# Patient Record
Sex: MALE | Race: WHITE | NOT HISPANIC OR LATINO | Employment: OTHER | URBAN - METROPOLITAN AREA
[De-identification: names, ages, dates, MRNs, and addresses within clinical notes are randomized per-mention and may not be internally consistent; named-entity substitution may affect disease eponyms.]

---

## 2023-10-27 ENCOUNTER — TRANSFERRED RECORDS (OUTPATIENT)
Dept: HEALTH INFORMATION MANAGEMENT | Facility: CLINIC | Age: 63
End: 2023-10-27

## 2023-10-27 LAB — EJECTION FRACTION: 50 %

## 2023-11-15 LAB — EJECTION FRACTION: 66 %

## 2024-02-26 ENCOUNTER — ANESTHESIA EVENT (OUTPATIENT)
Dept: SURGERY | Facility: HOSPITAL | Age: 64
DRG: 454 | End: 2024-02-26
Payer: MEDICARE

## 2024-02-26 RX ORDER — IBUPROFEN 800 MG/1
800 TABLET, FILM COATED ORAL AT BEDTIME
Status: ON HOLD | COMMUNITY
End: 2024-02-28

## 2024-02-26 RX ORDER — GRISEOFULVIN 250 MG/1
500 TABLET ORAL DAILY
Status: ON HOLD | COMMUNITY
End: 2024-02-27

## 2024-02-26 RX ORDER — OLMESARTAN MEDOXOMIL 20 MG/1
20 TABLET ORAL DAILY
COMMUNITY

## 2024-02-26 RX ORDER — DILTIAZEM HYDROCHLORIDE 240 MG/1
240 CAPSULE, EXTENDED RELEASE ORAL DAILY
Status: ON HOLD | COMMUNITY
End: 2024-02-27

## 2024-02-26 RX ORDER — ACETAMINOPHEN 325 MG/1
325 TABLET ORAL
COMMUNITY

## 2024-02-26 RX ORDER — ASPIRIN 325 MG
325 TABLET ORAL DAILY
COMMUNITY

## 2024-02-26 RX ORDER — ROSUVASTATIN CALCIUM 10 MG/1
10 TABLET, COATED ORAL
COMMUNITY

## 2024-02-27 ENCOUNTER — APPOINTMENT (OUTPATIENT)
Dept: RADIOLOGY | Facility: HOSPITAL | Age: 64
DRG: 454 | End: 2024-02-27
Attending: ORTHOPAEDIC SURGERY
Payer: MEDICARE

## 2024-02-27 ENCOUNTER — HOSPITAL ENCOUNTER (INPATIENT)
Facility: HOSPITAL | Age: 64
LOS: 1 days | Discharge: HOME | DRG: 454 | End: 2024-02-28
Attending: ORTHOPAEDIC SURGERY | Admitting: ORTHOPAEDIC SURGERY
Payer: MEDICARE

## 2024-02-27 ENCOUNTER — APPOINTMENT (OUTPATIENT)
Dept: PHYSICAL THERAPY | Facility: HOSPITAL | Age: 64
DRG: 454 | End: 2024-02-27
Attending: ORTHOPAEDIC SURGERY
Payer: MEDICARE

## 2024-02-27 ENCOUNTER — ANESTHESIA (OUTPATIENT)
Dept: SURGERY | Facility: HOSPITAL | Age: 64
DRG: 454 | End: 2024-02-27
Payer: MEDICARE

## 2024-02-27 ENCOUNTER — TRANSFERRED RECORDS (OUTPATIENT)
Dept: HEALTH INFORMATION MANAGEMENT | Facility: CLINIC | Age: 64
End: 2024-02-27

## 2024-02-27 DIAGNOSIS — M43.16 SPONDYLOLISTHESIS AT L4-L5 LEVEL: Primary | ICD-10-CM

## 2024-02-27 LAB — GLUCOSE BLDC GLUCOMTR-MCNC: 140 MG/DL (ref 70–99)

## 2024-02-27 PROCEDURE — 250N000009 HC RX 250: Performed by: ORTHOPAEDIC SURGERY

## 2024-02-27 PROCEDURE — 97116 GAIT TRAINING THERAPY: CPT | Mod: GP

## 2024-02-27 PROCEDURE — 370N000017 HC ANESTHESIA TECHNICAL FEE, PER MIN: Performed by: ORTHOPAEDIC SURGERY

## 2024-02-27 PROCEDURE — 272N000001 HC OR GENERAL SUPPLY STERILE: Performed by: ORTHOPAEDIC SURGERY

## 2024-02-27 PROCEDURE — 999N000180 XR SURGERY CARM FLUORO LESS THAN 5 MIN: Mod: TC

## 2024-02-27 PROCEDURE — 250N000011 HC RX IP 250 OP 636: Performed by: NURSE ANESTHETIST, CERTIFIED REGISTERED

## 2024-02-27 PROCEDURE — 0SB23ZZ EXCISION OF LUMBAR VERTEBRAL DISC, PERCUTANEOUS APPROACH: ICD-10-PCS | Performed by: ORTHOPAEDIC SURGERY

## 2024-02-27 PROCEDURE — 0SG0371 FUSION OF LUMBAR VERTEBRAL JOINT WITH AUTOLOGOUS TISSUE SUBSTITUTE, POSTERIOR APPROACH, POSTERIOR COLUMN, PERCUTANEOUS APPROACH: ICD-10-PCS | Performed by: ORTHOPAEDIC SURGERY

## 2024-02-27 PROCEDURE — 710N000009 HC RECOVERY PHASE 1, LEVEL 1, PER MIN: Performed by: ORTHOPAEDIC SURGERY

## 2024-02-27 PROCEDURE — 360N000085 HC SURGERY LEVEL 5 W/ FLUORO, PER MIN: Performed by: ORTHOPAEDIC SURGERY

## 2024-02-27 PROCEDURE — 250N000011 HC RX IP 250 OP 636: Performed by: ORTHOPAEDIC SURGERY

## 2024-02-27 PROCEDURE — 258N000003 HC RX IP 258 OP 636: Performed by: NURSE ANESTHETIST, CERTIFIED REGISTERED

## 2024-02-27 PROCEDURE — 97530 THERAPEUTIC ACTIVITIES: CPT | Mod: GP

## 2024-02-27 PROCEDURE — 0SG03A0 FUSION OF LUMBAR VERTEBRAL JOINT WITH INTERBODY FUSION DEVICE, ANTERIOR APPROACH, ANTERIOR COLUMN, PERCUTANEOUS APPROACH: ICD-10-PCS | Performed by: ORTHOPAEDIC SURGERY

## 2024-02-27 PROCEDURE — 99222 1ST HOSP IP/OBS MODERATE 55: CPT | Performed by: STUDENT IN AN ORGANIZED HEALTH CARE EDUCATION/TRAINING PROGRAM

## 2024-02-27 PROCEDURE — 999N000141 HC STATISTIC PRE-PROCEDURE NURSING ASSESSMENT: Performed by: ORTHOPAEDIC SURGERY

## 2024-02-27 PROCEDURE — 258N000003 HC RX IP 258 OP 636: Performed by: STUDENT IN AN ORGANIZED HEALTH CARE EDUCATION/TRAINING PROGRAM

## 2024-02-27 PROCEDURE — 250N000013 HC RX MED GY IP 250 OP 250 PS 637: Performed by: ORTHOPAEDIC SURGERY

## 2024-02-27 PROCEDURE — C1713 ANCHOR/SCREW BN/BN,TIS/BN: HCPCS | Performed by: ORTHOPAEDIC SURGERY

## 2024-02-27 PROCEDURE — 120N000001 HC R&B MED SURG/OB

## 2024-02-27 PROCEDURE — 250N000011 HC RX IP 250 OP 636: Performed by: ANESTHESIOLOGY

## 2024-02-27 PROCEDURE — 250N000009 HC RX 250: Performed by: NURSE ANESTHETIST, CERTIFIED REGISTERED

## 2024-02-27 PROCEDURE — 258N000003 HC RX IP 258 OP 636: Performed by: ORTHOPAEDIC SURGERY

## 2024-02-27 PROCEDURE — 250N000025 HC SEVOFLURANE, PER MIN: Performed by: ORTHOPAEDIC SURGERY

## 2024-02-27 PROCEDURE — C1762 CONN TISS, HUMAN(INC FASCIA): HCPCS | Performed by: ORTHOPAEDIC SURGERY

## 2024-02-27 PROCEDURE — L8699 PROSTHETIC IMPLANT NOS: HCPCS | Performed by: ORTHOPAEDIC SURGERY

## 2024-02-27 PROCEDURE — 97161 PT EVAL LOW COMPLEX 20 MIN: CPT | Mod: GP

## 2024-02-27 PROCEDURE — C9290 INJ, BUPIVACAINE LIPOSOME: HCPCS | Performed by: ORTHOPAEDIC SURGERY

## 2024-02-27 PROCEDURE — 4A11X4G MONITORING OF PERIPHERAL NERVOUS ELECTRICAL ACTIVITY, INTRAOPERATIVE, EXTERNAL APPROACH: ICD-10-PCS | Performed by: ORTHOPAEDIC SURGERY

## 2024-02-27 DEVICE — BONE CHIP CANCELLOUS 30CC 100030: Type: IMPLANTABLE DEVICE | Site: SPINE LUMBAR | Status: FUNCTIONAL

## 2024-02-27 DEVICE — GRAFT BONE INFUSE BMP MED 7510400: Type: IMPLANTABLE DEVICE | Site: SPINE LUMBAR | Status: FUNCTIONAL

## 2024-02-27 DEVICE — OMEGA XP™ 9MM X 28MM X 12MM 11° 	LUMBAR EXPANDABLE INTERBODY
Type: IMPLANTABLE DEVICE | Site: SPINE LUMBAR | Status: FUNCTIONAL
Brand: OMEGA XP

## 2024-02-27 RX ORDER — FENTANYL CITRATE 50 UG/ML
INJECTION, SOLUTION INTRAMUSCULAR; INTRAVENOUS PRN
Status: DISCONTINUED | OUTPATIENT
Start: 2024-02-27 | End: 2024-02-27

## 2024-02-27 RX ORDER — OXYCODONE HYDROCHLORIDE 5 MG/1
5 TABLET ORAL EVERY 4 HOURS PRN
Status: DISCONTINUED | OUTPATIENT
Start: 2024-02-27 | End: 2024-02-28 | Stop reason: HOSPADM

## 2024-02-27 RX ORDER — ONDANSETRON 4 MG/1
4 TABLET, ORALLY DISINTEGRATING ORAL EVERY 30 MIN PRN
Status: DISCONTINUED | OUTPATIENT
Start: 2024-02-27 | End: 2024-02-27 | Stop reason: HOSPADM

## 2024-02-27 RX ORDER — FENTANYL CITRATE 50 UG/ML
50 INJECTION, SOLUTION INTRAMUSCULAR; INTRAVENOUS EVERY 5 MIN PRN
Status: DISCONTINUED | OUTPATIENT
Start: 2024-02-27 | End: 2024-02-27 | Stop reason: HOSPADM

## 2024-02-27 RX ORDER — SODIUM CHLORIDE, SODIUM LACTATE, POTASSIUM CHLORIDE, CALCIUM CHLORIDE 600; 310; 30; 20 MG/100ML; MG/100ML; MG/100ML; MG/100ML
INJECTION, SOLUTION INTRAVENOUS CONTINUOUS
Status: DISCONTINUED | OUTPATIENT
Start: 2024-02-27 | End: 2024-02-27 | Stop reason: HOSPADM

## 2024-02-27 RX ORDER — MAGNESIUM HYDROXIDE 1200 MG/15ML
LIQUID ORAL PRN
Status: DISCONTINUED | OUTPATIENT
Start: 2024-02-27 | End: 2024-02-27 | Stop reason: HOSPADM

## 2024-02-27 RX ORDER — OXYCODONE HYDROCHLORIDE 5 MG/1
5-10 TABLET ORAL EVERY 4 HOURS PRN
Qty: 40 TABLET | Refills: 0 | Status: SHIPPED | OUTPATIENT
Start: 2024-02-27

## 2024-02-27 RX ORDER — LIDOCAINE HYDROCHLORIDE 10 MG/ML
INJECTION, SOLUTION INFILTRATION; PERINEURAL PRN
Status: DISCONTINUED | OUTPATIENT
Start: 2024-02-27 | End: 2024-02-27

## 2024-02-27 RX ORDER — MULTIVITAMIN,THERAPEUTIC
1 TABLET ORAL DAILY
Status: DISCONTINUED | OUTPATIENT
Start: 2024-02-28 | End: 2024-02-28 | Stop reason: HOSPADM

## 2024-02-27 RX ORDER — AMOXICILLIN 250 MG
1 CAPSULE ORAL 2 TIMES DAILY
Status: DISCONTINUED | OUTPATIENT
Start: 2024-02-27 | End: 2024-02-28 | Stop reason: HOSPADM

## 2024-02-27 RX ORDER — KETAMINE HYDROCHLORIDE 10 MG/ML
INJECTION INTRAMUSCULAR; INTRAVENOUS PRN
Status: DISCONTINUED | OUTPATIENT
Start: 2024-02-27 | End: 2024-02-27

## 2024-02-27 RX ORDER — ROSUVASTATIN CALCIUM 10 MG/1
10 TABLET, COATED ORAL
Status: DISCONTINUED | OUTPATIENT
Start: 2024-02-28 | End: 2024-02-28 | Stop reason: HOSPADM

## 2024-02-27 RX ORDER — SODIUM CHLORIDE 9 MG/ML
INJECTION, SOLUTION INTRAVENOUS CONTINUOUS
Status: DISCONTINUED | OUTPATIENT
Start: 2024-02-27 | End: 2024-02-28 | Stop reason: HOSPADM

## 2024-02-27 RX ORDER — ACETAMINOPHEN 325 MG/1
975 TABLET ORAL EVERY 8 HOURS
Qty: 27 TABLET | Refills: 0 | Status: DISCONTINUED | OUTPATIENT
Start: 2024-02-27 | End: 2024-02-28 | Stop reason: HOSPADM

## 2024-02-27 RX ORDER — ACETAMINOPHEN 325 MG/1
325 TABLET ORAL
Status: DISCONTINUED | OUTPATIENT
Start: 2024-02-27 | End: 2024-02-28 | Stop reason: HOSPADM

## 2024-02-27 RX ORDER — CEFAZOLIN SODIUM/WATER 2 G/20 ML
2 SYRINGE (ML) INTRAVENOUS SEE ADMIN INSTRUCTIONS
Status: DISCONTINUED | OUTPATIENT
Start: 2024-02-27 | End: 2024-02-27 | Stop reason: HOSPADM

## 2024-02-27 RX ORDER — LORATADINE 10 MG/1
10 TABLET ORAL DAILY PRN
Status: DISCONTINUED | OUTPATIENT
Start: 2024-02-27 | End: 2024-02-28 | Stop reason: HOSPADM

## 2024-02-27 RX ORDER — BISACODYL 10 MG
10 SUPPOSITORY, RECTAL RECTAL DAILY PRN
Status: DISCONTINUED | OUTPATIENT
Start: 2024-02-27 | End: 2024-02-28 | Stop reason: HOSPADM

## 2024-02-27 RX ORDER — OXYCODONE HYDROCHLORIDE 5 MG/1
10 TABLET ORAL EVERY 4 HOURS PRN
Status: DISCONTINUED | OUTPATIENT
Start: 2024-02-27 | End: 2024-02-28 | Stop reason: HOSPADM

## 2024-02-27 RX ORDER — HYDROMORPHONE HCL IN WATER/PF 6 MG/30 ML
0.2 PATIENT CONTROLLED ANALGESIA SYRINGE INTRAVENOUS
Status: DISCONTINUED | OUTPATIENT
Start: 2024-02-27 | End: 2024-02-28 | Stop reason: HOSPADM

## 2024-02-27 RX ORDER — PROPOFOL 10 MG/ML
INJECTION, EMULSION INTRAVENOUS PRN
Status: DISCONTINUED | OUTPATIENT
Start: 2024-02-27 | End: 2024-02-27

## 2024-02-27 RX ORDER — PROPOFOL 10 MG/ML
INJECTION, EMULSION INTRAVENOUS CONTINUOUS PRN
Status: DISCONTINUED | OUTPATIENT
Start: 2024-02-27 | End: 2024-02-27

## 2024-02-27 RX ORDER — ONDANSETRON 2 MG/ML
INJECTION INTRAMUSCULAR; INTRAVENOUS PRN
Status: DISCONTINUED | OUTPATIENT
Start: 2024-02-27 | End: 2024-02-27

## 2024-02-27 RX ORDER — NALOXONE HYDROCHLORIDE 0.4 MG/ML
0.4 INJECTION, SOLUTION INTRAMUSCULAR; INTRAVENOUS; SUBCUTANEOUS
Status: DISCONTINUED | OUTPATIENT
Start: 2024-02-27 | End: 2024-02-28 | Stop reason: HOSPADM

## 2024-02-27 RX ORDER — NALOXONE HYDROCHLORIDE 0.4 MG/ML
0.2 INJECTION, SOLUTION INTRAMUSCULAR; INTRAVENOUS; SUBCUTANEOUS
Status: DISCONTINUED | OUTPATIENT
Start: 2024-02-27 | End: 2024-02-28 | Stop reason: HOSPADM

## 2024-02-27 RX ORDER — CEFAZOLIN SODIUM 2 G/100ML
2 INJECTION, SOLUTION INTRAVENOUS EVERY 8 HOURS
Qty: 200 ML | Refills: 0 | Status: COMPLETED | OUTPATIENT
Start: 2024-02-27 | End: 2024-02-27

## 2024-02-27 RX ORDER — LOSARTAN POTASSIUM 50 MG/1
50 TABLET ORAL DAILY
Status: DISCONTINUED | OUTPATIENT
Start: 2024-02-28 | End: 2024-02-28 | Stop reason: HOSPADM

## 2024-02-27 RX ORDER — VASOPRESSIN IN 0.9 % NACL 2 UNIT/2ML
SYRINGE (ML) INTRAVENOUS PRN
Status: DISCONTINUED | OUTPATIENT
Start: 2024-02-27 | End: 2024-02-27

## 2024-02-27 RX ORDER — DILTIAZEM HYDROCHLORIDE 120 MG/1
240 CAPSULE, COATED, EXTENDED RELEASE ORAL DAILY
Status: DISCONTINUED | OUTPATIENT
Start: 2024-02-28 | End: 2024-02-28 | Stop reason: HOSPADM

## 2024-02-27 RX ORDER — DILTIAZEM HYDROCHLORIDE 240 MG/1
240 CAPSULE, COATED, EXTENDED RELEASE ORAL DAILY
COMMUNITY

## 2024-02-27 RX ORDER — BUPIVACAINE HYDROCHLORIDE 2.5 MG/ML
INJECTION, SOLUTION INFILTRATION; PERINEURAL PRN
Status: DISCONTINUED | OUTPATIENT
Start: 2024-02-27 | End: 2024-02-27 | Stop reason: HOSPADM

## 2024-02-27 RX ORDER — HALOPERIDOL 5 MG/ML
1 INJECTION INTRAMUSCULAR
Status: DISCONTINUED | OUTPATIENT
Start: 2024-02-27 | End: 2024-02-27 | Stop reason: HOSPADM

## 2024-02-27 RX ORDER — HYDROMORPHONE HYDROCHLORIDE 1 MG/ML
0.4 INJECTION, SOLUTION INTRAMUSCULAR; INTRAVENOUS; SUBCUTANEOUS EVERY 5 MIN PRN
Status: DISCONTINUED | OUTPATIENT
Start: 2024-02-27 | End: 2024-02-27 | Stop reason: HOSPADM

## 2024-02-27 RX ORDER — PROCHLORPERAZINE MALEATE 5 MG
5 TABLET ORAL EVERY 6 HOURS PRN
Status: DISCONTINUED | OUTPATIENT
Start: 2024-02-27 | End: 2024-02-28 | Stop reason: HOSPADM

## 2024-02-27 RX ORDER — ONDANSETRON 2 MG/ML
4 INJECTION INTRAMUSCULAR; INTRAVENOUS EVERY 30 MIN PRN
Status: DISCONTINUED | OUTPATIENT
Start: 2024-02-27 | End: 2024-02-27 | Stop reason: HOSPADM

## 2024-02-27 RX ORDER — EPHEDRINE SULFATE 50 MG/ML
INJECTION, SOLUTION INTRAMUSCULAR; INTRAVENOUS; SUBCUTANEOUS PRN
Status: DISCONTINUED | OUTPATIENT
Start: 2024-02-27 | End: 2024-02-27

## 2024-02-27 RX ORDER — ACETAMINOPHEN 325 MG/1
650 TABLET ORAL EVERY 4 HOURS PRN
Status: DISCONTINUED | OUTPATIENT
Start: 2024-03-01 | End: 2024-02-28 | Stop reason: HOSPADM

## 2024-02-27 RX ORDER — HYDROMORPHONE HYDROCHLORIDE 1 MG/ML
0.2 INJECTION, SOLUTION INTRAMUSCULAR; INTRAVENOUS; SUBCUTANEOUS EVERY 5 MIN PRN
Status: DISCONTINUED | OUTPATIENT
Start: 2024-02-27 | End: 2024-02-27 | Stop reason: HOSPADM

## 2024-02-27 RX ORDER — NALOXONE HYDROCHLORIDE 0.4 MG/ML
0.1 INJECTION, SOLUTION INTRAMUSCULAR; INTRAVENOUS; SUBCUTANEOUS
Status: DISCONTINUED | OUTPATIENT
Start: 2024-02-27 | End: 2024-02-27 | Stop reason: HOSPADM

## 2024-02-27 RX ORDER — ONDANSETRON 4 MG/1
4 TABLET, ORALLY DISINTEGRATING ORAL EVERY 6 HOURS PRN
Status: DISCONTINUED | OUTPATIENT
Start: 2024-02-27 | End: 2024-02-28 | Stop reason: HOSPADM

## 2024-02-27 RX ORDER — DEXAMETHASONE SODIUM PHOSPHATE 10 MG/ML
INJECTION, SOLUTION INTRAMUSCULAR; INTRAVENOUS PRN
Status: DISCONTINUED | OUTPATIENT
Start: 2024-02-27 | End: 2024-02-27

## 2024-02-27 RX ORDER — FENTANYL CITRATE 50 UG/ML
25 INJECTION, SOLUTION INTRAMUSCULAR; INTRAVENOUS EVERY 5 MIN PRN
Status: DISCONTINUED | OUTPATIENT
Start: 2024-02-27 | End: 2024-02-27 | Stop reason: HOSPADM

## 2024-02-27 RX ORDER — ONDANSETRON 2 MG/ML
4 INJECTION INTRAMUSCULAR; INTRAVENOUS EVERY 6 HOURS PRN
Status: DISCONTINUED | OUTPATIENT
Start: 2024-02-27 | End: 2024-02-28 | Stop reason: HOSPADM

## 2024-02-27 RX ORDER — LIDOCAINE 40 MG/G
CREAM TOPICAL
Status: DISCONTINUED | OUTPATIENT
Start: 2024-02-27 | End: 2024-02-27 | Stop reason: HOSPADM

## 2024-02-27 RX ORDER — HYDROMORPHONE HCL IN WATER/PF 6 MG/30 ML
0.4 PATIENT CONTROLLED ANALGESIA SYRINGE INTRAVENOUS
Status: DISCONTINUED | OUTPATIENT
Start: 2024-02-27 | End: 2024-02-28 | Stop reason: HOSPADM

## 2024-02-27 RX ORDER — MEPERIDINE HYDROCHLORIDE 25 MG/ML
12.5 INJECTION INTRAMUSCULAR; INTRAVENOUS; SUBCUTANEOUS EVERY 5 MIN PRN
Status: DISCONTINUED | OUTPATIENT
Start: 2024-02-27 | End: 2024-02-27 | Stop reason: HOSPADM

## 2024-02-27 RX ORDER — POLYETHYLENE GLYCOL 3350 17 G/17G
17 POWDER, FOR SOLUTION ORAL DAILY
Status: DISCONTINUED | OUTPATIENT
Start: 2024-02-28 | End: 2024-02-28 | Stop reason: HOSPADM

## 2024-02-27 RX ADMIN — Medication 10 MG: at 08:15

## 2024-02-27 RX ADMIN — Medication 1 UNITS: at 08:58

## 2024-02-27 RX ADMIN — CEFAZOLIN SODIUM 2 G: 2 INJECTION, SOLUTION INTRAVENOUS at 14:44

## 2024-02-27 RX ADMIN — Medication 10 MG: at 07:52

## 2024-02-27 RX ADMIN — SODIUM CHLORIDE, POTASSIUM CHLORIDE, SODIUM LACTATE AND CALCIUM CHLORIDE: 600; 310; 30; 20 INJECTION, SOLUTION INTRAVENOUS at 09:51

## 2024-02-27 RX ADMIN — PROPOFOL 100 MCG/KG/MIN: 10 INJECTION, EMULSION INTRAVENOUS at 08:00

## 2024-02-27 RX ADMIN — DEXAMETHASONE SODIUM PHOSPHATE 10 MG: 10 INJECTION, SOLUTION INTRAMUSCULAR; INTRAVENOUS at 07:40

## 2024-02-27 RX ADMIN — FENTANYL CITRATE 50 MCG: 50 INJECTION INTRAMUSCULAR; INTRAVENOUS at 07:31

## 2024-02-27 RX ADMIN — HYDROMORPHONE HYDROCHLORIDE 0.4 MG: 1 INJECTION, SOLUTION INTRAMUSCULAR; INTRAVENOUS; SUBCUTANEOUS at 11:55

## 2024-02-27 RX ADMIN — PHENYLEPHRINE HYDROCHLORIDE 0.3 MCG/KG/MIN: 10 INJECTION INTRAVENOUS at 07:59

## 2024-02-27 RX ADMIN — OXYCODONE HYDROCHLORIDE 10 MG: 5 TABLET ORAL at 21:49

## 2024-02-27 RX ADMIN — FENTANYL CITRATE 25 MCG: 50 INJECTION, SOLUTION INTRAMUSCULAR; INTRAVENOUS at 10:54

## 2024-02-27 RX ADMIN — ACETAMINOPHEN 975 MG: 325 TABLET ORAL at 13:43

## 2024-02-27 RX ADMIN — Medication 10 MG: at 07:50

## 2024-02-27 RX ADMIN — ACETAMINOPHEN 325 MG: 325 TABLET ORAL at 15:58

## 2024-02-27 RX ADMIN — Medication 10 MG: at 08:00

## 2024-02-27 RX ADMIN — DEXMEDETOMIDINE HYDROCHLORIDE 4 MCG: 100 INJECTION, SOLUTION INTRAVENOUS at 09:45

## 2024-02-27 RX ADMIN — OXYCODONE HYDROCHLORIDE 5 MG: 5 TABLET ORAL at 13:44

## 2024-02-27 RX ADMIN — DEXMEDETOMIDINE HYDROCHLORIDE 8 MCG: 100 INJECTION, SOLUTION INTRAVENOUS at 07:53

## 2024-02-27 RX ADMIN — FENTANYL CITRATE 25 MCG: 50 INJECTION, SOLUTION INTRAMUSCULAR; INTRAVENOUS at 11:28

## 2024-02-27 RX ADMIN — SODIUM CHLORIDE, POTASSIUM CHLORIDE, SODIUM LACTATE AND CALCIUM CHLORIDE: 600; 310; 30; 20 INJECTION, SOLUTION INTRAVENOUS at 07:40

## 2024-02-27 RX ADMIN — SENNOSIDES AND DOCUSATE SODIUM 1 TABLET: 8.6; 5 TABLET ORAL at 21:00

## 2024-02-27 RX ADMIN — Medication 2 G: at 07:30

## 2024-02-27 RX ADMIN — FENTANYL CITRATE 50 MCG: 50 INJECTION INTRAMUSCULAR; INTRAVENOUS at 07:40

## 2024-02-27 RX ADMIN — OXYCODONE HYDROCHLORIDE 10 MG: 5 TABLET ORAL at 17:37

## 2024-02-27 RX ADMIN — Medication 100 MG: at 07:40

## 2024-02-27 RX ADMIN — Medication 10 MG: at 08:20

## 2024-02-27 RX ADMIN — Medication 1 UNITS: at 08:16

## 2024-02-27 RX ADMIN — LIDOCAINE HYDROCHLORIDE 50 MG: 10 INJECTION, SOLUTION INFILTRATION; PERINEURAL at 07:40

## 2024-02-27 RX ADMIN — PROPOFOL 200 MG: 10 INJECTION, EMULSION INTRAVENOUS at 07:40

## 2024-02-27 RX ADMIN — ACETAMINOPHEN 975 MG: 325 TABLET ORAL at 21:00

## 2024-02-27 RX ADMIN — KETAMINE HYDROCHLORIDE 50 MG: 10 INJECTION INTRAMUSCULAR; INTRAVENOUS at 07:40

## 2024-02-27 RX ADMIN — PHENYLEPHRINE HYDROCHLORIDE 100 MCG: 10 INJECTION INTRAVENOUS at 07:48

## 2024-02-27 RX ADMIN — ONDANSETRON 4 MG: 2 INJECTION INTRAMUSCULAR; INTRAVENOUS at 09:48

## 2024-02-27 RX ADMIN — CEFAZOLIN SODIUM 2 G: 2 INJECTION, SOLUTION INTRAVENOUS at 22:31

## 2024-02-27 RX ADMIN — FENTANYL CITRATE 25 MCG: 50 INJECTION, SOLUTION INTRAMUSCULAR; INTRAVENOUS at 11:02

## 2024-02-27 RX ADMIN — FENTANYL CITRATE 25 MCG: 50 INJECTION, SOLUTION INTRAMUSCULAR; INTRAVENOUS at 11:39

## 2024-02-27 RX ADMIN — SODIUM CHLORIDE: 9 INJECTION, SOLUTION INTRAVENOUS at 13:44

## 2024-02-27 ASSESSMENT — ACTIVITIES OF DAILY LIVING (ADL)
ADLS_ACUITY_SCORE: 21
ADLS_ACUITY_SCORE: 23
ADLS_ACUITY_SCORE: 21
ADLS_ACUITY_SCORE: 21
ADLS_ACUITY_SCORE: 26
ADLS_ACUITY_SCORE: 23
ADLS_ACUITY_SCORE: 21
ADLS_ACUITY_SCORE: 26
ADLS_ACUITY_SCORE: 26
ADLS_ACUITY_SCORE: 23
ADLS_ACUITY_SCORE: 27
ADLS_ACUITY_SCORE: 21
ADLS_ACUITY_SCORE: 21
ADLS_ACUITY_SCORE: 27
ADLS_ACUITY_SCORE: 21
ADLS_ACUITY_SCORE: 26

## 2024-02-27 ASSESSMENT — ENCOUNTER SYMPTOMS: DYSRHYTHMIAS: 1

## 2024-02-27 NOTE — CONSULTS
[facility]  Consult Note - Hospitalist Service  Date of Admission:  2/27/2024  Consult Requested by: Maciej Payne MD  Reason for Consult: Management of hypertension    Assessment & Plan   Ke Cox is a 63 year old male admitted on 2/27/2024. He has a h/o HTN, HLD, afib. He was admitted for  admitted for elective surgery of low back pain. Hospitalist service consulted for Management of hypertension    Lumbar 4-5 spinal stenosis with spondylolisthesis with radiculopathy  -- s/p posterior spinal fusion with instrumentation L4-5 on 02/27  -- post op care including DVT ppx, AC, pain Mx per orthopedics    Atrial fibrillation  -- c/w PTA Diltiazem  -- Resume Eliquis when ok with ortho    Essential hypertension  -- monitor vital signs per protocol  -- c/w PTA diltiazem, olmesartan    Hyperlipidemia  -- c/w PTA rosuvastatin    Cardiomyopathy  -- Resume ASA when ok with orthopedics    EMRE  -- Currently not using CPAP         Clinically Significant Risk Factors Present on Admission               # Drug Induced Coagulation Defect: home medication list includes an anticoagulant medication  # Drug Induced Platelet Defect: home medication list includes an antiplatelet medication   # Hypertension: Home medication list includes antihypertensive(s)                 Jocelyn Rainey MD  Hospitalist Service  Securely message with [facility] (more info)  Text page via [employer] Paging/Directory   ______________________________________________________________________    Chief Complaint   Back pain    History is obtained from the patient    History of Present Illness   Ke Cox is a 63 year old male who has a h/o HTN, HLD, afib. He was admitted for  admitted for elective surgery of low back pain. Hospitalist service consulted for Management of hypertension      Past Medical History    Past Medical History:   Diagnosis Date    Arthritis     Atrial fibrillation (H)     Cerebral dysfunction      Diverticular disease of colon     HLD (hyperlipidemia)     Low back pain     Sleep apnea        Past Surgical History   Past Surgical History:   Procedure Laterality Date    BACK SURGERY      COLONOSCOPY      ORTHOPEDIC SURGERY         Medications   I have reviewed this patient's current medications       Review of Systems    The 10 point Review of Systems is negative other than noted in the HPI or here.      Physical Exam   Vital Signs: Temp: 98  F (36.7  C) Temp src: Temporal BP: 118/75 Pulse: 77   Resp: 20 SpO2: 96 % O2 Device: None (Room air) Oxygen Delivery: 30 LPM  Weight: 212 lbs 11.2 oz    GEN: Alert and oriented. Not in acute distress.  HEENT: Atraumatic, mucous membrane- moist and pink.  Chest: Bilateral air entry.  CVS: S1S2 regular.   Abdomen: Soft. Non-tender, non-distended. No organomegaly. No guarding or rigidity. Bowel sounds active.   Extremities: No pedal edema.  CNS: No involuntary movements.  Skin: no cyanosis or clubbing.     Medical Decision Making       55 MINUTES SPENT BY ME on the date of service doing chart review, history, exam, documentation & further activities per the note.      Data

## 2024-02-27 NOTE — ANESTHESIA CARE TRANSFER NOTE
Patient: Ke Cox    Procedure: Procedure(s):  LUMBAR FOUR - FIVE OBLIQUE LATERAL LUMBAR INTERBODY FUSION WITH LUMBAR FOUR - FIVE POSTERIOR FUSION WITH NEURO MONITORING       Diagnosis: Spinal stenosis [M48.00]  Spondylolisthesis [M43.10]  Diagnosis Additional Information: No value filed.    Anesthesia Type:   General     Note:    Oropharynx: oropharynx clear of all foreign objects  Level of Consciousness: drowsy    Level of Supplemental Oxygen (L/min / FiO2): 8  Independent Airway: airway patency satisfactory and stable  Dentition: dentition unchanged  Vital Signs Stable: post-procedure vital signs reviewed and stable  Report to RN Given: handoff report given  Patient transferred to: PACU    Handoff Report: Identifed the Patient, Identified the Reponsible Provider, Reviewed the pertinent medical history, Discussed the surgical course, Reviewed Intra-OP anesthesia mangement and issues during anesthesia, Set expectations for post-procedure period and Allowed opportunity for questions and acknowledgement of understanding      Vitals:  Vitals Value Taken Time   /46 02/27/24 1018   Temp 36.2  C (97.1  F) 02/27/24 1018   Pulse 59 02/27/24 1025   Resp 16 02/27/24 1025   SpO2 97 % 02/27/24 1025   Vitals shown include unfiled device data.    Electronically Signed By: IRVIN Medeiros CRNA  February 27, 2024  10:27 AM

## 2024-02-27 NOTE — PLAN OF CARE
Problem: Adult Inpatient Plan of Care  Goal: Optimal Comfort and Wellbeing  Outcome: Progressing     Problem: Adult Inpatient Plan of Care  Goal: Absence of Hospital-Acquired Illness or Injury  Intervention: Prevent Infection  Recent Flowsheet Documentation  Taken 2/27/2024 1400 by Elbert Anaya RN  Infection Prevention:   cohorting utilized   rest/sleep promoted   hand hygiene promoted     Goal Outcome Evaluation:    Plan of Care Reviewed With: patient, spouse    A&Ox4. RA on pulse ox sating mid 90s. 1x PRN oxy given for pain. Numbness and tingling in LLE at baseline. L PIV NS at 50cc. Clear liquid and advance as tolerated. Family in room. Continue to monitor.

## 2024-02-27 NOTE — ANESTHESIA PREPROCEDURE EVALUATION
Anesthesia Pre-Procedure Evaluation    Patient: Ke Cox   MRN: [id number] : 1960        Procedure : Procedure(s):  LUMBAR FOUR - FIVE OBLIQUE LATERAL LUMBAR INTERBODY FUSION WITH LUMBAR FOUR - FIVE POSTERIOR FUSION          Past Medical History:   Diagnosis Date    Arthritis     Atrial fibrillation (H)     Cerebral dysfunction     Diverticular disease of colon     HLD (hyperlipidemia)     Low back pain     Sleep apnea       Past Surgical History:   Procedure Laterality Date    BACK SURGERY      COLONOSCOPY      ORTHOPEDIC SURGERY        Not on File   Social History     Tobacco Use    Smoking status: Never    Smokeless tobacco: Never   Substance Use Topics    Alcohol use: Never      Wt Readings from Last 1 Encounters:   24 96.5 kg (212 lb 11.2 oz)        Anesthesia Evaluation   Pt has had prior anesthetic.     No history of anesthetic complications       ROS/MED HX  ENT/Pulmonary:     (+) sleep apnea, mild, doesn't use CPAP,                                      Neurologic:     (+)    peripheral neuropathy (LLE),                         (-) no CVA   Cardiovascular:     (+) Dyslipidemia hypertension- -   -  - -   Taking blood thinners  Instructions Given to patient: Last dose of eliquis one week ago.                   dysrhythmias, a-fib,             METS/Exercise Tolerance:     Hematologic:  - neg hematologic  ROS     Musculoskeletal:   (+)  arthritis,             GI/Hepatic:    (-) GERD   Renal/Genitourinary:  - neg Renal ROS     Endo:     (+)               Obesity,       Psychiatric/Substance Use:  - neg psychiatric ROS     Infectious Disease:  - neg infectious disease ROS     Malignancy:  - neg malignancy ROS     Other:  - neg other ROS          Physical Exam    Airway  airway exam normal      Mallampati: III   TM distance: > 3 FB   Neck ROM: full   Mouth opening: > 3 cm    Respiratory Devices and Support         Dental  no notable dental history     (+) Modest Abnormalities - crowns,  "retainers, 1 or 2 missing teeth    B=Bridge, C=Chipped, L=Loose, M=Missing    Cardiovascular          Rhythm and rate: irregular and normal     Pulmonary   pulmonary exam normal        breath sounds clear to auscultation           OUTSIDE LABS:  CBC: No results found for: \"WBC\", \"HGB\", \"HCT\", \"PLT\"  BMP:   Lab Results   Component Value Date     (H) 02/27/2024     COAGS: No results found for: \"PTT\", \"INR\", \"FIBR\"  POC: No results found for: \"BGM\", \"HCG\", \"HCGS\"  HEPATIC: No results found for: \"ALBUMIN\", \"PROTTOTAL\", \"ALT\", \"AST\", \"GGT\", \"ALKPHOS\", \"BILITOTAL\", \"BILIDIRECT\", \"OVI\"  OTHER: No results found for: \"PH\", \"LACT\", \"A1C\", \"YOLI\", \"PHOS\", \"MAG\", \"LIPASE\", \"AMYLASE\", \"TSH\", \"T4\", \"T3\", \"CRP\", \"SED\"    Anesthesia Plan    ASA Status:  3    NPO Status:  NPO Appropriate    Anesthesia Type: General.     - Airway: ETT   Induction: Intravenous, Propofol.   Maintenance: Balanced.        Consents    Anesthesia Plan(s) and associated risks, benefits, and realistic alternatives discussed. Questions answered and patient/representative(s) expressed understanding.     - Discussed:     - Discussed with:  Patient            Postoperative Care    Pain management: IV analgesics, Oral pain medications, Multi-modal analgesia.   PONV prophylaxis: Ondansetron (or other 5HT-3), Dexamethasone or Solumedrol, Droperidol or Haldol     Comments:    Other Comments: Discussed the risk of tooth dislodgement given loose right upper front tooth. Patient understands the risks and agrees to proceed.            Woodrow Neal MD    I have reviewed the pertinent notes and labs in the chart from the past 30 days and (re)examined the patient.  Any updates or changes from those notes are reflected in this note.            # Drug Induced Coagulation Defect: home medication list includes an anticoagulant medication  # Drug Induced Platelet Defect: home medication list includes an antiplatelet medication      "

## 2024-02-27 NOTE — ANESTHESIA POSTPROCEDURE EVALUATION
Patient: Ke Cox    Procedure: Procedure(s):  LUMBAR FOUR - FIVE OBLIQUE LATERAL LUMBAR INTERBODY FUSION WITH LUMBAR FOUR - FIVE POSTERIOR FUSION WITH NEURO MONITORING       Anesthesia Type:  General    Note:  Disposition: Admission   Postop Pain Control: Uneventful            Sign Out: Well controlled pain   PONV: No   Neuro/Psych: Uneventful            Sign Out: Acceptable/Baseline neuro status   Airway/Respiratory: Uneventful            Sign Out: Acceptable/Baseline resp. status   CV/Hemodynamics: Uneventful            Sign Out: Acceptable CV status; No obvious hypovolemia; No obvious fluid overload   Other NRE: NONE   DID A NON-ROUTINE EVENT OCCUR? No           Last vitals:  Vitals Value Taken Time   /67 02/27/24 1145   Temp 36.8  C (98.2  F) 02/27/24 1145   Pulse 63 02/27/24 1159   Resp 12 02/27/24 1145   SpO2 94 % 02/27/24 1159   Vitals shown include unfiled device data.    Electronically Signed By: Woodrow Neal MD  February 27, 2024  12:01 PM

## 2024-02-27 NOTE — ANESTHESIA PROCEDURE NOTES
Airway         Procedure Start/Stop Times: 2/27/2024 7:44 AM  Staff -        Anesthesiologist:  Woodrow Neal MD       CRNA: Carmen Martinez APRN CRNA       Performed By: anesthesiologistIndications and Patient Condition       Indications for airway management: toni-procedural       Induction type:intravenous       Mask difficulty assessment: 1 - vent by mask    Final Airway Details       Final airway type: endotracheal airway       Successful airway: ETT - single  Endotracheal Airway Details        ETT size (mm): 7.5       Cuffed: yes       Successful intubation technique: video laryngoscopy       Grade View of Cords: 1       Adjucts: stylet       Position: Right       Measured from: lips       Secured at (cm): 23       Bite block used: None    Post intubation assessment        Placement verified by: capnometry, equal breath sounds and chest rise        Number of attempts at approach: 1       Number of other approaches attempted: 0       Ease of procedure: easy       Dentition: Intact    Medication(s) Administered   Medication Administration Time: 2/27/2024 7:44 AM

## 2024-02-27 NOTE — PHARMACY-ADMISSION MEDICATION HISTORY
Pharmacist Admission Medication History    Admission medication history is complete. The information provided in this note is only as accurate as the sources available at the time of the update.    Information Source(s): Patient via in-person    Pertinent Information: Patient took home Diltiazem and Olmesartan today.     Changes made to PTA medication list:  Added: All are new.   Deleted: None  Changed: None    Allergies reviewed with patient and updates made in EHR: yes    Medication History Completed By: JUN VALLEJO RPH 2/27/2024 7:25 AM    PTA Med List   Medication Sig Last Dose    acetaminophen (TYLENOL) 325 MG tablet Take 325 mg by mouth once as needed for mild pain Past Month at prn    apixaban ANTICOAGULANT (ELIQUIS) 5 MG tablet Take 5 mg by mouth 2 times daily Past Week at am    aspirin (ASA) 325 MG tablet Take 325 mg by mouth daily Past Week at am    diltiazem ER COATED BEADS (CARDIZEM CD/CARTIA XT) 240 MG 24 hr capsule Take 240 mg by mouth daily 2/27/2024 at am    ibuprofen (ADVIL/MOTRIN) 800 MG tablet Take 800 mg by mouth at bedtime Past Week at hs    olmesartan (BENICAR) 20 MG tablet Take 20 mg by mouth daily 2/27/2024 at am    rosuvastatin (CRESTOR) 10 MG tablet Take 10 mg by mouth Every Mon, Wed, Fri Morning 2/26/2024 at am

## 2024-02-27 NOTE — OP NOTE
Procedure(s):  1.  Posterior spinal fusion with instrumentation lumbar 4-5.  CPTcode 78233  2.  Placement of lumbar 4-5 nonsegmental instrumentation.  CPT code 53511  3.  Lumbar 4-5 oblique lateral lumbar interbody fusion.  Note the approach was anterior to the transverse processalong the lateral aspect of the spine.  CPT code is 75282  4.  Insertion of lumbar 4-5 biomechanical device.  CPT code is 42627  5.  Left lumbar 4 vertebral body bone marrow aspirate.  CPT code 55316  6.  Useof allograft for spinal fusion.  CPT code 76620    Pre-Procedure Diagnosis:  Lumbar 4-5 spinal stenosis with spondylolisthesis with radiculopathy    Post-Procedure Diagnosis:    SAME    Surgeon(s):  Maciej Payne MD     Anesthesia Type:  General (general endotracheal tube)    Neuro monitoring used with normal values obtained during the procedure.    Components implanted:  Spinal elements posterior pedicle screw margie construct, spinal elements omega interbody spacer, crushed cancellous allograft, Medtronic infuse.    Findings:  Spinal stenosis and spondylolisthesis lumbar 4-5    Operative Report:    Patient was taken to the operating room, given a general endotracheal anesthetic agent, subsequently placed in the prone position.  The patient was prepped and draped in normal sterile fashion.  Intraoperative fluoroscopy was utilized to identify the appropriate starting position for the placement of the pedicle screws.  2 incisions were made approximately 5 cm lateral to midline after anesthetizing the skin with Marcaine.  The Jamshidi needle was then advanced to the bilateral lumbar 4 pedicles and with the use of fluoroscopy was placed through the pedicle to the vertebral body.  This was confirmed with AP and lateral fluoroscopic imaging.  A bone marrow aspirate was obtained from the left L4 vertebral body and placed on 30 cc crushed cancellous allograft.    A guidewire was placed in the Jamshidi needle and then subsequently soft tissue  dilators were placed over the guidewire after anesthetizing the deep subcutaneous tissues with Marcaine and Exparel.  EMG monitoring was then utilized to test the guidewire to determine that it was not in contact with the nerve.  After this was completed a spinal elements pedicle screw was then placed over the guidewire into the vertebral body and tested with EMG monitoring and fluoroscopy was utilized to confirm appropriate positioning.  After screws were placed at the L4 level screws were placed in the same manner as L4 at the L5 level.  Prior to placement of the pedicle screw the posterior cortex was decorticated and bone graft was packed within the facet joint and along the posterior lateral aspect of the spine.L5 pedicle screws were placed and tested with EMG monitoring as performed at the L4 level.     I then turned my attention to preparation for placement of the left L4-5 OLIF procedure and implant.  Intraoperative fluoroscopy was utilized to determine the appropriate starting position.  The skin was anesthetized with Marcaine.  A spinal needle was then advanced to the superior articular process of the left L4-5 facet joint.  Confirming this with AP lateral and oblique imaging I then advanced a Jamshidi needle into the superior articular process.  A guidewire was placed followed by the use of a trephine and a series of blunt tip reamers to enlarge the foramen.  I then placed a guidewire along the foraminal aspect of the disc space and then placed an EMG probe overlying this.  A threshold of 4 mA was used and no evidence of neurologic activity was noted.  A guidewire was then advanced into the disc space followed by the use of a dilator followed by the use of a portal after this was tapped into position a drill was advanced through the portal to the anterior aspect of this space and then removed.  A series of soft tissue removal instruments were then utilized to resect the disc material.  This included a  rotary shaver, a pituitary, ring curettes, and then subsequently suction tube was placed.  Crushed cancellous allograft and Medtronic infuse were then packed into the disc space.  After this was completed a drill was then utilized to provide a pathway through the packed bone graft and a guidewire was placed.    I then advanced a spinal elements omega interbody spacer device over the guidewire into the disc space and confirmed this to be in appropriate position with EMG monitoring.  Mechanical interbody spacer was then deployed withuse of a screwdriver followed by radiographic imaging confirming appropriate placement.      The appropriate length rods were affixed to the pedicle screws, caps were placed and tightened to 's torque specifications.Final fluoroscopic images confirmed appropriate placement of the implants.  Neuro monitoring identified that no changes had occurred.    Wounds were irrigated and then closed in layered fashion with 2-0 Vicryl to approximate the deep soft tissues, subcutaneous tissues, and skin.   Monocryl was used to close the skin, Dermabond style sealant and Steri-Strips were applied.    Estimated Blood Loss:   20 cc    Specimens:    None       Drains:   None    Complications:    None    Maciej Payne MD   [phone]    Send copy to: Dr. Maciej Payne

## 2024-02-27 NOTE — PROGRESS NOTES
"   02/27/24 1381   Appointment Info   Signing Clinician's Name / Credentials (PT) Iona Antony, PT, DPT   Living Environment   Current Living Arrangements house   Home Accessibility stairs to enter home   Number of Stairs, Main Entrance 2   Stair Railings, Main Entrance railing on left side (ascending)   Living Environment Comments pt able to complete functional mobility at home   Self-Care   Equipment Currently Used at Home none  (has a FWW)   Fall history within last six months yes   Number of times patient has fallen within last six months 1   General Information   Onset of Illness/Injury or Date of Surgery 02/27/24   Referring Physician Maciej Payne MD   Patient/Family Therapy Goals Statement (PT) Return to Home   Pertinent History of Current Problem (include personal factors and/or comorbidities that impact the POC) Per Chart Review post op \"LUMBAR FOUR - FIVE OBLIQUE LATERAL LUMBAR INTERBODY FUSION WITH LUMBAR FOUR - FIVE POSTERIOR FUSION WITH NEURO MONITORING\"   Existing Precautions/Restrictions fall;spinal  (No brace required per orders)   Range of Motion (ROM)   Range of Motion ROM deficits secondary to pain   Strength (Manual Muscle Testing)   Strength (Manual Muscle Testing) Deficits observed during functional mobility   Bed Mobility   Bed Mobility supine-sit   Supine-Sit [place] (Bed Mobility) supervision;verbal cues;minimum assist (75% patient effort);1 person assist   Transfers   Transfers sit-stand transfer   Maintains Weight-bearing Status (Transfers) able to maintain   Sit-Stand Transfer   Sit-Stand [place] (Transfers) supervision;verbal cues;contact guard;1 person assist   Assistive Device (Sit-Stand Transfers) walker, front-wheeled   Gait/Stairs (Locomotion)   [place] Level (Gait) supervision;verbal cues;contact guard   Assistive Device (Gait) walker, front-wheeled   Distance in Feet (Gait) 5   Pattern (Gait) step-through;swing-through   Deviations/Abnormal Patterns " (Gait) gait speed decreased   Maintains Weight-bearing Status (Gait) able to maintain   Clinical Impression   Criteria for Skilled Therapeutic Intervention Yes, treatment indicated   PT Diagnosis (PT) Impaired functional mobility   Influenced by the following impairments weakness, decreased ROM, impaired balance, pain   Functional limitations due to impairments gait, transfers, stairs   Clinical Presentation (PT Evaluation Complexity) stable   Clinical Presentation Rationale pt presents as medically diagnosed   Clinical Decision Making (Complexity) low complexity   Planned Therapy Interventions (PT) balance training;bed mobility training;gait training;home exercise program;ROM (range of motion);stair training;strengthening;transfer training   Risk & Benefits of therapy have been explained evaluation/treatment results reviewed;patient   PT Total Evaluation Time   PT Eval, Low Complexity Minutes (22829) 10   Physical Therapy Goals   PT Frequency 2x/day   PT Predicted Duration/Target Date for Goal Attainment 02/29/24   PT Goals Bed Mobility;Transfers;Gait;Stairs;PT Goal 1   PT: Bed Mobility Supervision/stand-by assist;Supine to/from sit;Within precautions   PT: Transfers Supervision/stand-by assist;Sit to/from stand;Assistive device;Within precautions   PT: Gait Supervision/stand-by assist;Rolling walker;Within precautions;Greater than 200 feet   PT: Stairs Supervision/stand-by assist;Within precautions;2 stairs;Rail on left   PT: Goal 1 pt will be mod I with HEP   Interventions   Interventions Quick Adds Gait Training;Therapeutic Activity   Therapeutic Activity   Therapeutic Activities: dynamic activities to improve functional performance Minutes (96892) 8   Symptoms Noted During/After Treatment Fatigue   Treatment Detail/Skilled Intervention Sit to/from stand: cueing for safety/technique/hand placement on stable surface, CGA; Scooting fwd EOB: cueing for safety/technique, CGA; Sitting balance EOB & on toilet: cueing  for safety, SBA - Max A donning brief -  cueing for precautions; sit to supine: cueing for safety/log roll technique, Min A for LE management; Education around log roll technique and precautions. Bed alarm on and call light within reach at end of session   Gait Training   Gait Training Minutes (21153) 10   Symptoms Noted During/After Treatment (Gait Training) fatigue   Treatment Detail/Skilled Intervention cueing for safety/posture/small steps with turns to avoid twisting - decreased gait speed noted, slow cautious gait. Steady with addition of FWW   Distance in Feet 175   [place] Level (Gait Training) contact guard   Physical Assistance Level (Gait Training) supervision;verbal cues   Weight Bearing (Gait Training) weight-bearing as tolerated   Assistive Device (Gait Training) rolling walker   Pattern Analysis (Gait Training) swing-through gait   Gait Analysis Deviations decreased debi;decreased step length   Impairments (Gait Analysis/Training) balance impaired;strength decreased   PT Discharge Planning   PT Plan stairs, HEP, log roll technique/precaution education, gait   PT Discharge Recommendation (DC Rec) home with assist   PT Rationale for DC Rec pending safe stair trial, anticipate pt will be able to complete required functional mobility at home. Assist from family as needed with mobility   PT Brief overview of current status CGA for transfers & gait with '; Min A for bed mobility   PT Equipment Needed at Discharge walker, rolling   Total Session Time   Timed Code Treatment Minutes 18   Total Session Time (sum of timed and untimed services) 28

## 2024-02-28 ENCOUNTER — APPOINTMENT (OUTPATIENT)
Dept: PHYSICAL THERAPY | Facility: HOSPITAL | Age: 64
DRG: 454 | End: 2024-02-28
Attending: ORTHOPAEDIC SURGERY
Payer: MEDICARE

## 2024-02-28 ENCOUNTER — APPOINTMENT (OUTPATIENT)
Dept: OCCUPATIONAL THERAPY | Facility: HOSPITAL | Age: 64
DRG: 454 | End: 2024-02-28
Attending: ORTHOPAEDIC SURGERY
Payer: MEDICARE

## 2024-02-28 VITALS
SYSTOLIC BLOOD PRESSURE: 143 MMHG | WEIGHT: 212.7 LBS | RESPIRATION RATE: 20 BRPM | HEART RATE: 81 BPM | DIASTOLIC BLOOD PRESSURE: 63 MMHG | TEMPERATURE: 97.7 F | OXYGEN SATURATION: 95 %

## 2024-02-28 LAB
BASOPHILS # BLD AUTO: 0 10E3/UL (ref 0–0.2)
BASOPHILS NFR BLD AUTO: 0 %
EOSINOPHIL # BLD AUTO: 0 10E3/UL (ref 0–0.7)
EOSINOPHIL NFR BLD AUTO: 0 %
ERYTHROCYTE [DISTWIDTH] IN BLOOD BY AUTOMATED COUNT: 13.1 % (ref 10–15)
HCT VFR BLD AUTO: 44.2 % (ref 40–53)
HGB BLD-MCNC: 14.5 G/DL (ref 13.3–17.7)
IMM GRANULOCYTES # BLD: 0.1 10E3/UL
IMM GRANULOCYTES NFR BLD: 0 %
LYMPHOCYTES # BLD AUTO: 1 10E3/UL (ref 0.8–5.3)
LYMPHOCYTES NFR BLD AUTO: 6 %
MCH RBC QN AUTO: 28.6 PG (ref 26.5–33)
MCHC RBC AUTO-ENTMCNC: 32.8 G/DL (ref 31.5–36.5)
MCV RBC AUTO: 87 FL (ref 78–100)
MONOCYTES # BLD AUTO: 1.4 10E3/UL (ref 0–1.3)
MONOCYTES NFR BLD AUTO: 9 %
NEUTROPHILS # BLD AUTO: 13.5 10E3/UL (ref 1.6–8.3)
NEUTROPHILS NFR BLD AUTO: 85 %
NRBC # BLD AUTO: 0 10E3/UL
NRBC BLD AUTO-RTO: 0 /100
PLATELET # BLD AUTO: 217 10E3/UL (ref 150–450)
RBC # BLD AUTO: 5.07 10E6/UL (ref 4.4–5.9)
WBC # BLD AUTO: 16 10E3/UL (ref 4–11)

## 2024-02-28 PROCEDURE — 250N000011 HC RX IP 250 OP 636: Performed by: ORTHOPAEDIC SURGERY

## 2024-02-28 PROCEDURE — 250N000013 HC RX MED GY IP 250 OP 250 PS 637: Performed by: ORTHOPAEDIC SURGERY

## 2024-02-28 PROCEDURE — 99232 SBSQ HOSP IP/OBS MODERATE 35: CPT | Performed by: STUDENT IN AN ORGANIZED HEALTH CARE EDUCATION/TRAINING PROGRAM

## 2024-02-28 PROCEDURE — 97535 SELF CARE MNGMENT TRAINING: CPT | Mod: GO

## 2024-02-28 PROCEDURE — 85025 COMPLETE CBC W/AUTO DIFF WBC: CPT | Performed by: STUDENT IN AN ORGANIZED HEALTH CARE EDUCATION/TRAINING PROGRAM

## 2024-02-28 PROCEDURE — 97530 THERAPEUTIC ACTIVITIES: CPT | Mod: GP

## 2024-02-28 PROCEDURE — 36415 COLL VENOUS BLD VENIPUNCTURE: CPT | Performed by: STUDENT IN AN ORGANIZED HEALTH CARE EDUCATION/TRAINING PROGRAM

## 2024-02-28 PROCEDURE — 97116 GAIT TRAINING THERAPY: CPT | Mod: GP

## 2024-02-28 PROCEDURE — 97110 THERAPEUTIC EXERCISES: CPT | Mod: GP

## 2024-02-28 PROCEDURE — 97166 OT EVAL MOD COMPLEX 45 MIN: CPT | Mod: GO

## 2024-02-28 RX ADMIN — SENNOSIDES AND DOCUSATE SODIUM 1 TABLET: 8.6; 5 TABLET ORAL at 10:18

## 2024-02-28 RX ADMIN — DILTIAZEM HYDROCHLORIDE 240 MG: 120 CAPSULE, COATED, EXTENDED RELEASE ORAL at 10:18

## 2024-02-28 RX ADMIN — POLYETHYLENE GLYCOL 3350 17 G: 17 POWDER, FOR SOLUTION ORAL at 10:18

## 2024-02-28 RX ADMIN — OXYCODONE HYDROCHLORIDE 10 MG: 5 TABLET ORAL at 14:15

## 2024-02-28 RX ADMIN — OXYCODONE HYDROCHLORIDE 10 MG: 5 TABLET ORAL at 05:08

## 2024-02-28 RX ADMIN — OXYCODONE HYDROCHLORIDE 10 MG: 5 TABLET ORAL at 10:21

## 2024-02-28 RX ADMIN — THERA TABS 1 TABLET: TAB at 10:18

## 2024-02-28 RX ADMIN — ACETAMINOPHEN 975 MG: 325 TABLET ORAL at 13:37

## 2024-02-28 RX ADMIN — ROSUVASTATIN CALCIUM 10 MG: 10 TABLET, FILM COATED ORAL at 10:18

## 2024-02-28 RX ADMIN — ACETAMINOPHEN 975 MG: 325 TABLET ORAL at 05:08

## 2024-02-28 RX ADMIN — HYDROMORPHONE HYDROCHLORIDE 0.2 MG: 0.2 INJECTION, SOLUTION INTRAMUSCULAR; INTRAVENOUS; SUBCUTANEOUS at 00:51

## 2024-02-28 ASSESSMENT — ACTIVITIES OF DAILY LIVING (ADL)
ADLS_ACUITY_SCORE: 33
ADLS_ACUITY_SCORE: 27
ADLS_ACUITY_SCORE: 27
ADLS_ACUITY_SCORE: 33

## 2024-02-28 NOTE — PLAN OF CARE
Physical Therapy Discharge Summary    Reason for therapy discharge:    Discharged to home with home therapy.    Progress towards therapy goal(s). See goals on Care Plan in [facility] electronic health record for goal details.  Goals partially met.  Barriers to achieving goals:   discharge from facility.    Therapy recommendation(s):    Continued therapy is recommended.  Rationale/Recommendations:  Continue with home PT as pt is progressing towards goals.

## 2024-02-28 NOTE — PLAN OF CARE
Problem: Spinal Surgery  Goal: Optimal Pain Control and Function  Outcome: Progressing  Intervention: Prevent or Manage Pain  Recent Flowsheet Documentation  Taken 2/28/2024 0508 by Shanon Gusman RN  Pain Management Interventions:   medication (see MAR)   repositioned     Problem: Spinal Surgery  Goal: Effective Urinary Elimination  Outcome: Progressing     Problem: Spinal Surgery  Goal: Effective Oxygenation and Ventilation  Outcome: Progressing  Intervention: Optimize Oxygenation and Ventilation  Recent Flowsheet Documentation  Taken 2/28/2024 0528 by Shanon Gusman RN  Head of Bed (HOB) Positioning: HOB at 30 degrees  Taken 2/28/2024 0051 by Shanon Gusman RN  Head of Bed (HOB) Positioning: HOB at 20 degrees   Goal Outcome Evaluation:    Pt doing well post-op.  Back pain managed with oxycodone, tylenol and 1 time iv dilaudid.  Pt slept between cares.  Lower back dressing clean dry and intact.  Ambulating to bathroom with stand by assist.  Voiding in large amounts.  Some residual numbness in lower extremities. Remains on room air with sats in the upper 90's.

## 2024-02-28 NOTE — PLAN OF CARE
Problem: Adult Inpatient Plan of Care  Goal: Absence of Hospital-Acquired Illness or Injury  Intervention: Identify and Manage Fall Risk  Recent Flowsheet Documentation  Taken 2/27/2024 1545 by Anika Payne RN  Safety Promotion/Fall Prevention:   activity supervised   assistive device/personal items within reach   nonskid shoes/slippers when out of bed   patient and family education   safety round/check completed  Goal: Optimal Comfort and Wellbeing  Intervention: Monitor Pain and Promote Comfort  Recent Flowsheet Documentation  Taken 2/27/2024 1833 by Anika Payne RN  Pain Management Interventions:   medication offered but refused   distraction   pillow support provided   repositioned   rest  Taken 2/27/2024 1737 by Anika Payne RN  Pain Management Interventions:   medication (see MAR)   cold applied  Taken 2/27/2024 1701 by Anika Payne RN  Pain Management Interventions:   medication offered but refused   ambulation/increased activity  Taken 2/27/2024 1530 by Anika Payne RN  Pain Management Interventions: medication (see MAR)  Goal: Readiness for Transition of Care  Intervention: Mutually Develop Transition Plan  Recent Flowsheet Documentation  Taken 2/27/2024 2100 by Anika Payne RN  Patient/Family Anticipates Transition to: home     Problem: Pain Acute  Goal: Optimal Pain Control and Function  Intervention: Develop Pain Management Plan  Recent Flowsheet Documentation  Taken 2/27/2024 1833 by Anika Payne RN  Pain Management Interventions:   medication offered but refused   distraction   pillow support provided   repositioned   rest  Taken 2/27/2024 1737 by Anika Payne RN  Pain Management Interventions:   medication (see MAR)   cold applied  Taken 2/27/2024 1701 by Anika Payne RN  Pain Management Interventions:   medication offered but refused   ambulation/increased activity  Taken 2/27/2024 1530 by Anika Payne RN  Pain Management Interventions: medication (see  MAR)  Intervention: Prevent or Manage Pain  Recent Flowsheet Documentation  Taken 2/27/2024 1545 by Anika Payne RN  Medication Review/Management: medications reviewed     Problem: Mobility Impairment  Goal: Optimal Mobility  Intervention: Optimize Mobility  Recent Flowsheet Documentation  Taken 2/27/2024 2053 by Anika Payne RN  Assistive Device Utilized:   gait belt   walker  Activity Management:   ambulated to bathroom   back to bed  Taken 2/27/2024 1720 by Anika Payne RN  Assistive Device Utilized: walker  Activity Management:   ambulated to bathroom   ambulated outside room  Taken 2/27/2024 1530 by Anika Payne RN  Activity Management:   activity adjusted per tolerance   activity encouraged   Goal Outcome Evaluation:         S/p lumbar surgery. Vitals stable, CMS intact, prior neuropathy in BLE. Aox4, calm and cooperative. Moderate to severe back pain, stable and controlled with prn oxycodone. Steri strips intact in back incision, scant drainage, continue to monitor. Participated in therapy at start of shift, ambulated outside room, stand-by assist with walker. Voiding well post marte removal, low pvrs.

## 2024-02-28 NOTE — PLAN OF CARE
Occupational Therapy Discharge Summary    Reason for therapy discharge:    Discharged to home.    Progress towards therapy goal(s). See goals on Care Plan in [facility] electronic health record for goal details.  Goals met    Therapy recommendation(s):    No further therapy is recommended.        Ingrid CASTLE, OTR/L, CLT 2/28/2024 , 2:07 PM

## 2024-02-28 NOTE — PROGRESS NOTES
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  CPTcode 26085  2.  Placement of lumbar 4-5 nonsegmental instrumentation.  CPT code 47402  3.  Lumbar 4-5 oblique lateral lumbar interbody fusion.  Note the approach was anterior to the transverse processalong the lateral aspect of the spine.  CPT code is 55382  4.  Insertion of lumbar 4-5 biomechanical device.  CPT code is 29553  5.  Left lumbar 4 vertebral body bone marrow aspirate.  CPT code 98445  6.  Useof allograft for spinal fusion.   Existing Precautions/Restrictions spinal   Cognitive Status Examination   Affect/Mental Status (Cognitive) WNL   Follows Commands WNL   Bed Mobility   Bed Mobility supine-sit   Supine-Sit [place] (Bed Mobility) supervision   Transfers   Transfers sit-stand transfer   Sit-Stand Transfer   Sit-Stand [place] (Transfers) supervision   Sit/Stand Transfer Comments SBA with FWW to ambulate in room   Balance   Balance Assessment no deficits identified   Activities of Daily Living   BADL Assessment/Intervention   (post op education will be provided)   Clinical Impression   Criteria for Skilled Therapeutic Interventions Met (OT) Yes, treatment indicated   OT Diagnosis post op education for ADLs   Assessment of Occupational Performance 1-3 Performance Deficits   Identified Performance Deficits trskhalif, drsg   Planned Therapy Interventions (OT) ADL retraining;transfer training   Clinical Decision Making Complexity (OT) detailed  assessment/moderate complexity   Risk & Benefits of therapy have been explained care plan/treatment goals reviewed   OT Total Evaluation Time   OT Eval, Moderate Complexity Minutes (69789) 8   OT Goals   Therapy Frequency (OT) One time eval and treatment   OT Predicted Duration/Target Date for Goal Attainment 02/28/24   OT Goals Lower Body Dressing;Transfers;Toilet Transfer/Toileting   OT: Lower Body Dressing Supervision/stand-by assist   OT: Transfer Supervision/stand-by assist   OT: Toilet Transfer/Toileting Supervision/stand-by assist   Interventions   Interventions Quick Adds Therapeutic Procedures/Exercise;Self-Care/Home Management   Self-Care/Home Management   Self-Care/Home Mgmt/ADL, Compensatory, Meal Prep Minutes (07889) 23   Treatment Detail/Skilled Intervention Ambulation in [facility] with SBA and FWW. Demo'd SBA with car trsf after education, toilet trsfs-SBA, Tub/shower- SBA. Instructed on post op precautions. Patient familiar with from previous surgeries. Family plans to A with LB drsg as needed   OT Discharge Planning   OT Discharge Recommendation (DC Rec) home   Total Session Time   Timed Code Treatment Minutes 23   Total Session Time (sum of timed and untimed services) 31

## 2024-02-28 NOTE — PROGRESS NOTES
[facility]    Medicine Progress Note - Hospitalist Service    Date of Admission:  2/27/2024    Assessment & Plan   Ke Cox is a 63 year old male admitted on 2/27/2024. He has a h/o HTN, HLD, afib. He was admitted for  admitted for elective surgery of low back pain. Hospitalist service consulted for Management of hypertension    Lumbar 4-5 spinal stenosis with spondylolisthesis with radiculopathy  -- s/p posterior spinal fusion with instrumentation L4-5 on 02/27  -- post op care including DVT ppx, AC, pain Mx per orthopedics    Atrial fibrillation  -- c/w PTA Diltiazem  -- Ortho resumed Eliquis on discharge.    Essential hypertension  -- monitor vital signs per protocol  -- c/w PTA diltiazem, olmesartan    Hyperlipidemia  -- c/w PTA rosuvastatin    Cardiomyopathy  -- Ortho resumed ASA on discharge    EMRE  -- Currently not using CPAP            Diet: Regular Diet Adult  Discharge Instruction - Regular Diet Adult    DVT Prophylaxis: Pneumatic Compression Devices  Chase Catheter: Not present  Lines: None     Cardiac Monitoring: None  Code Status: Full Code      Clinically Significant Risk Factors                                    Disposition Plan      Expected Discharge Date: 02/28/2024,  9:00 AM    Destination: home              Jocelyn Rainey MD  Hospitalist Service  [facility]  Securely message with [employer] (more info)  Text page via [employer] Paging/Directory   ______________________________________________________________________    Interval History   Patient is seen and examined at bedside.  No complaints today. Wants to go home.     Physical Exam   Vital Signs: Temp: 97.7  F (36.5  C) Temp src: Oral BP: (!) 141/70 Pulse: 81   Resp: 20 SpO2: 95 % O2 Device: None (Room air) Oxygen Delivery: 30 LPM  Weight: 212 lbs 11.2 oz    GEN: Alert and oriented. Not in acute distress.  HEENT: Atraumatic, mucous membrane- moist and pink.  Chest: Bilateral air entry.  CVS:  S1S2 regular.   Abdomen: Soft. Non-tender, non-distended. No organomegaly. No guarding or rigidity. Bowel sounds active.   Extremities: No pedal edema.  CNS: No involuntary movements.  Skin: no cyanosis or clubbing.     Medical Decision Making       45 MINUTES SPENT BY ME on the date of service doing chart review, history, exam, documentation & further activities per the note.      Data

## 2024-02-28 NOTE — DISCHARGE SUMMARY
10/12/2021: Patient is receiving infusions Monoclonal antibodies for CLL monthly. Continuing through the end of the year and then will be on an oral medications. Monitor. LUMBAR DISCHARGE SUMMARY    Patient Name: Ke Cox  YOB: 1960  Age: 63 year old   Medical Record Number: [id number]   Primary Physician: Ke Howell   Phone: [phone]   Admission Date: 2/27/2024   Discharge Date:  2/28/2024    Diagnosis: spinal fusion      DISCHARGE MEDICATIONS  See discharge medication list    FOLLOW-UP:  He  should follow up with Dr. Payne via phone call or telemedicine visit in 1-3 days via [phone].      Additional followup: Patient will followup with primary care as needed.    Special Instructions: No lifting over 20 pounds.  Limit bending and twisting.  Dressing off tomorrow.  Ok to shower.  Pat incision dry when done.     Brace:  none     BRIEF HOSPITAL COURSE: This 63 year old  male was admitted to the betts s.p. Procedure(s):  LUMBAR FOUR - FIVE OBLIQUE LATERAL LUMBAR INTERBODY FUSION WITH LUMBAR FOUR - FIVE POSTERIOR FUSION WITH NEURO MONITORING. The patient had a stable post operative course. Standard prophylactic antibiotics were administerd and the patient received DVT prophylaxis per service protocol. The patient's pain was initially controlled on intravenous pain medications and then weaned to oral medications prior to discharge.      PROCEDURES PERFORMED DURING HOSPITALIZATION: Procedure(s):  LUMBAR FOUR - FIVE OBLIQUE LATERAL LUMBAR INTERBODY FUSION WITH LUMBAR FOUR - FIVE POSTERIOR FUSION WITH NEURO MONITORING on 2/27/2024    COMPLICATIONS IN HOSPITAL: none    IMPORTANT PENDING TEST RESULTS: None    PERTINENT FINDINGS/RESULTS AT DISCHARGE: None    DISCHARGE DIAGNOSES:    See above list    Total time spent for discharge on date of discharge: 10 minutes

## 2024-02-28 NOTE — PLAN OF CARE
Goal Outcome Evaluation:  Pt to discharge after pain med effectiveness eval. Paperwork given and explained to pt and wife. All questions answered. Oxy prescription filled and given to pt.

## 2024-02-28 NOTE — PLAN OF CARE
"  Problem: Adult Inpatient Plan of Care  Goal: Plan of Care Review  Description: The Plan of Care Review/Shift note should be completed every shift.  The Outcome Evaluation is a brief statement about your assessment that the patient is improving, declining, or no change.  This information will be displayed automatically on your shift  note.  Outcome: Progressing  Goal: Patient-Specific Goal (Individualized)  Description: You can add care plan individualizations to a care plan. Examples of Individualization might be:  \"Parent requests to be called daily at 9am for status\", \"I have a hard time hearing out of my right ear\", or \"Do not touch me to wake me up as it startles  me\".  Outcome: Progressing  Goal: Absence of Hospital-Acquired Illness or Injury  Outcome: Progressing  Goal: Optimal Comfort and Wellbeing  Outcome: Progressing  Goal: Readiness for Transition of Care  Outcome: Progressing     Problem: Pain Acute  Goal: Optimal Pain Control and Function  Outcome: Progressing  Intervention: Prevent or Manage Pain  Recent Flowsheet Documentation  Taken 2/28/2024 1017 by Eileen Madrigal RN  Bowel Elimination Promotion:   adequate fluid intake promoted   ambulation promoted   privacy promoted     Problem: Mobility Impairment  Goal: Optimal Mobility  Outcome: Progressing     Problem: Spinal Surgery  Goal: Optimal Coping with Surgery  Outcome: Progressing  Goal: Absence of Bleeding  Outcome: Progressing  Goal: Effective Bowel Elimination  Outcome: Progressing  Intervention: Enhance Bowel Motility and Elimination  Recent Flowsheet Documentation  Taken 2/28/2024 1017 by Eileen Madrigal RN  Bowel Elimination Management:   hygiene measures promoted   relaxation techniques promoted   toileting offered  Goal: Fluid and Electrolyte Balance  Outcome: Progressing  Goal: Optimal Functional Ability  Outcome: Progressing  Goal: Absence of Infection Signs and Symptoms  Outcome: Progressing  Goal: Optimal Neurologic " Function  Outcome: Progressing  Goal: Anesthesia/Sedation Recovery  Outcome: Progressing  Goal: Optimal Pain Control and Function  Outcome: Progressing  Goal: Nausea and Vomiting Relief  Outcome: Progressing  Goal: Effective Urinary Elimination  Outcome: Progressing  Goal: Effective Oxygenation and Ventilation  Outcome: Progressing   Goal Outcome Evaluation:    Pt alert and oriented x4, calm and cooperative. Pt working well with PT and OT and walking in halls, SBA. Pt pain 4-5/10 described as sore at incision sites. Pt CMS intact, reported mild tingling in LLE, present preop.  Pain controlled with rest, repo, and PRN oxycodone. Pt voiding well and passing gas. Pt set to discharge home with wife later today. Vitals stable, pt afebrile and O2 sat 94-95% on RA.

## 2024-03-20 ENCOUNTER — DOCUMENTATION ONLY (OUTPATIENT)
Dept: SURGERY | Facility: AMBULATORY SURGERY CENTER | Age: 64
End: 2024-03-20
Payer: COMMERCIAL

## 2024-03-20 DIAGNOSIS — M43.16 SPONDYLOLISTHESIS OF LUMBAR REGION: Primary | ICD-10-CM

## (undated) DEVICE — Device

## (undated) DEVICE — DILATOR INSULATED PHANTOM XL

## (undated) DEVICE — IOM CASE FLAT FEE

## (undated) DEVICE — DRAPE C-ARMOR 5 SIDED 5523

## (undated) DEVICE — GLOVE BIOGEL PI ULTRATOUCH G SZ 6.0 42160

## (undated) DEVICE — GOWN XXL 9575

## (undated) DEVICE — SUTURE MONOCRYL+ 4-0 PS-2 27IN MCP426H

## (undated) DEVICE — GLOVE UNDER INDICATOR PI SZ 8.5 LF 41685

## (undated) DEVICE — WRAP THRP UNV LMBR GEL SFT SUB-2

## (undated) DEVICE — IOM STANDARD SUPPLY FEE

## (undated) DEVICE — RX SURGIFLO HEMOSTATIC MATRIX W/THROMBIN 8ML 2994

## (undated) DEVICE — LEAD WIRE MULTI-STAGE CLIP-ON DISP LF 302775-200

## (undated) DEVICE — ESU GROUND PAD ADULT REM W/15' CORD E7507DB

## (undated) DEVICE — PREP DYNA-HEX 4% CHG SCRUB 4OZ BOTTLE MDS098710

## (undated) DEVICE — GLOVE UNDER INDICATOR PI SZ 6.5 LF 41665

## (undated) DEVICE — DISC CUTTER BLADE

## (undated) DEVICE — SU DERMABOND ADVANCED .7ML DNX12

## (undated) DEVICE — DRAPE C-ARM 60X42" 1013

## (undated) DEVICE — SYR 05ML LL W/O NDL

## (undated) DEVICE — CUSTOM PACK LUMBAR FUSION SNE5BLFHEA

## (undated) DEVICE — SUTURE VICRYL+ 2-0 CT-2 27" UND VCP269H

## (undated) DEVICE — DRSG STERI STRIP 1/2X4" R1547

## (undated) DEVICE — CUSHION INSERT LG PRONE VIEW JACKSON TABLE

## (undated) DEVICE — DRAPE STERI TOWEL LG 1010

## (undated) DEVICE — NEEDLE SPINAL DISP 22GA X 3.5" QUINCKE 333320

## (undated) DEVICE — ESU PENCIL SMOKE EVAC W/ROCKER SWITCH 0703-047-000

## (undated) DEVICE — SOL ISOPROPYL RUBBING ALCOHOL USP 70% 4OZ HDX-20 I0020

## (undated) DEVICE — SUCTION MANIFOLD NEPTUNE 2 SYS 1 PORT 702-025-000

## (undated) DEVICE — CATH FOLEY COUDE 14FR 5ML LUBRICATH LATEX 0168L14

## (undated) DEVICE — SOL WATER IRRIG 1000ML BOTTLE 2F7114

## (undated) DEVICE — TRAY PREP DRY SKIN SCRUB 067

## (undated) DEVICE — POSITIONER ARM CRADLE LAMINECTOMY DISP

## (undated) DEVICE — SUCTION TIP POOLE STERILE 35040

## (undated) DEVICE — GLOVE SURG PI ULTRA TOUCH M SZ 8-1/2 LF

## (undated) DEVICE — NEEDLE ASP 15CM 11GA BN MRW BVL STY RAN-1115N5B

## (undated) RX ORDER — LIDOCAINE HYDROCHLORIDE 10 MG/ML
INJECTION, SOLUTION EPIDURAL; INFILTRATION; INTRACAUDAL; PERINEURAL
Status: DISPENSED
Start: 2024-02-27

## (undated) RX ORDER — PROPOFOL 10 MG/ML
INJECTION, EMULSION INTRAVENOUS
Status: DISPENSED
Start: 2024-02-27

## (undated) RX ORDER — FENTANYL CITRATE 50 UG/ML
INJECTION, SOLUTION INTRAMUSCULAR; INTRAVENOUS
Status: DISPENSED
Start: 2024-02-27

## (undated) RX ORDER — FENTANYL CITRATE-0.9 % NACL/PF 10 MCG/ML
PLASTIC BAG, INJECTION (ML) INTRAVENOUS
Status: DISPENSED
Start: 2024-02-27

## (undated) RX ORDER — EPHEDRINE SULFATE 50 MG/ML
INJECTION, SOLUTION INTRAMUSCULAR; INTRAVENOUS; SUBCUTANEOUS
Status: DISPENSED
Start: 2024-02-27

## (undated) RX ORDER — BUPIVACAINE HYDROCHLORIDE 2.5 MG/ML
INJECTION, SOLUTION EPIDURAL; INFILTRATION; INTRACAUDAL
Status: DISPENSED
Start: 2024-02-27

## (undated) RX ORDER — DEXAMETHASONE SODIUM PHOSPHATE 10 MG/ML
INJECTION, SOLUTION INTRAMUSCULAR; INTRAVENOUS
Status: DISPENSED
Start: 2024-02-27

## (undated) RX ORDER — ONDANSETRON 2 MG/ML
INJECTION INTRAMUSCULAR; INTRAVENOUS
Status: DISPENSED
Start: 2024-02-27